# Patient Record
Sex: MALE
[De-identification: names, ages, dates, MRNs, and addresses within clinical notes are randomized per-mention and may not be internally consistent; named-entity substitution may affect disease eponyms.]

---

## 2022-04-28 PROBLEM — Z00.00 ENCOUNTER FOR PREVENTIVE HEALTH EXAMINATION: Status: ACTIVE | Noted: 2022-04-28

## 2022-04-29 ENCOUNTER — APPOINTMENT (OUTPATIENT)
Dept: UROLOGY | Facility: CLINIC | Age: 37
End: 2022-04-29
Payer: COMMERCIAL

## 2022-04-29 ENCOUNTER — NON-APPOINTMENT (OUTPATIENT)
Age: 37
End: 2022-04-29

## 2022-04-29 DIAGNOSIS — R68.82 DECREASED LIBIDO: ICD-10-CM

## 2022-04-29 DIAGNOSIS — N52.9 MALE ERECTILE DYSFUNCTION, UNSPECIFIED: ICD-10-CM

## 2022-04-29 DIAGNOSIS — R53.83 OTHER FATIGUE: ICD-10-CM

## 2022-04-29 DIAGNOSIS — Z83.3 FAMILY HISTORY OF DIABETES MELLITUS: ICD-10-CM

## 2022-04-29 PROCEDURE — 99204 OFFICE O/P NEW MOD 45 MIN: CPT

## 2022-04-29 NOTE — ASSESSMENT
[FreeTextEntry1] : 37 yo male with low libido and energy level with normal recent free T.  We discussed hypogonadism and normal testosterone levels.  He has not had a total T level drawn.  I suggested we obtain his AM total T.  We also discussed his ED.  We reviewed ED treatments including PDE5I.  We discussed potential side effects of medication.  he would like to try tadalafil prn.  \par \par Plan:\par 1.  Pt to return for Am total T\par 2.  Tadalafil 20 mg (1/2 tab) prn for ED\par 3. F/u after # 1

## 2022-04-29 NOTE — HISTORY OF PRESENT ILLNESS
[FreeTextEntry1] : 37 yo male with 3 months of increased fatigue, low energy, low libido, and difficulty maintaining erections.  He has difficulty maintaining erections with masturbation.  He has noticed fewer morning erections.  He saw a physician who ordered a free T which was 76.5 (4/15/22).  He denies any voiding dysfunction.

## 2023-07-26 ENCOUNTER — RX RENEWAL (OUTPATIENT)
Age: 38
End: 2023-07-26

## 2023-07-26 RX ORDER — TADALAFIL 20 MG/1
20 TABLET ORAL
Qty: 6 | Refills: 6 | Status: ACTIVE | COMMUNITY
Start: 2022-04-29 | End: 1900-01-01